# Patient Record
Sex: FEMALE | Race: ASIAN | URBAN - METROPOLITAN AREA
[De-identification: names, ages, dates, MRNs, and addresses within clinical notes are randomized per-mention and may not be internally consistent; named-entity substitution may affect disease eponyms.]

---

## 2017-01-04 ENCOUNTER — TELEPHONE (OUTPATIENT)
Dept: FAMILY MEDICINE | Facility: CLINIC | Age: 42
End: 2017-01-04

## 2017-01-04 NOTE — TELEPHONE ENCOUNTER
Patient notified with information noted below from provider and agrees with plan to follow up with oncology ASAP.  Janene Harrison RN  [employer]riage [employer]

## 2017-01-04 NOTE — TELEPHONE ENCOUNTER
Took call from Dr. Mcadams from [facility] on behalf of Yara Breaux, It appears that patient has a large 7 cm necrotic lymph node in her pelvis and 3 new reactive lymph nodes, appears metastatic. She needs to follow up with her oncologist ASAP.     Triage- please call patient with results and instructions to see her oncologist.

## 2017-01-04 NOTE — TELEPHONE ENCOUNTER
Had test completed at [facility] on December 30 th. Contacted [facility] and records states they have not been read out and will send request to them as STAT and send results to provider for review.  Please watch for fax.  Janene Harrison RN  [employer]

## 2017-01-07 ENCOUNTER — TRANSFERRED RECORDS (OUTPATIENT)
Dept: HEALTH INFORMATION MANAGEMENT | Facility: CLINIC | Age: 42
End: 2017-01-07

## 2017-01-08 ENCOUNTER — TRANSFERRED RECORDS (OUTPATIENT)
Dept: HEALTH INFORMATION MANAGEMENT | Facility: CLINIC | Age: 42
End: 2017-01-08

## 2017-01-09 ENCOUNTER — TRANSFERRED RECORDS (OUTPATIENT)
Dept: HEALTH INFORMATION MANAGEMENT | Facility: CLINIC | Age: 42
End: 2017-01-09

## 2017-01-10 ENCOUNTER — TRANSFERRED RECORDS (OUTPATIENT)
Dept: HEALTH INFORMATION MANAGEMENT | Facility: CLINIC | Age: 42
End: 2017-01-10

## 2017-01-19 ENCOUNTER — TRANSFERRED RECORDS (OUTPATIENT)
Dept: HEALTH INFORMATION MANAGEMENT | Facility: CLINIC | Age: 42
End: 2017-01-19

## 2017-01-26 ENCOUNTER — TRANSFERRED RECORDS (OUTPATIENT)
Dept: HEALTH INFORMATION MANAGEMENT | Facility: CLINIC | Age: 42
End: 2017-01-26

## 2017-03-07 ENCOUNTER — OFFICE VISIT (OUTPATIENT)
Dept: FAMILY MEDICINE | Facility: CLINIC | Age: 42
End: 2017-03-07
Payer: COMMERCIAL

## 2017-03-07 ENCOUNTER — RADIANT APPOINTMENT (OUTPATIENT)
Dept: GENERAL RADIOLOGY | Facility: CLINIC | Age: 42
End: 2017-03-07
Attending: INTERNAL MEDICINE
Payer: COMMERCIAL

## 2017-03-07 VITALS
SYSTOLIC BLOOD PRESSURE: 86 MMHG | HEIGHT: 62 IN | TEMPERATURE: 99.9 F | WEIGHT: 93.4 LBS | DIASTOLIC BLOOD PRESSURE: 58 MMHG | BODY MASS INDEX: 17.19 KG/M2 | HEART RATE: 103 BPM | OXYGEN SATURATION: 98 %

## 2017-03-07 DIAGNOSIS — R50.9 FEVER, UNSPECIFIED: Primary | ICD-10-CM

## 2017-03-07 DIAGNOSIS — C79.9 METASTASIS FROM CERVICAL CANCER (H): ICD-10-CM

## 2017-03-07 DIAGNOSIS — R50.9 FEVER, UNSPECIFIED: ICD-10-CM

## 2017-03-07 DIAGNOSIS — C53.9 METASTASIS FROM CERVICAL CANCER (H): ICD-10-CM

## 2017-03-07 DIAGNOSIS — N30.00 ACUTE CYSTITIS WITHOUT HEMATURIA: ICD-10-CM

## 2017-03-07 DIAGNOSIS — R05.9 COUGH: ICD-10-CM

## 2017-03-07 DIAGNOSIS — R82.90 NONSPECIFIC FINDING ON EXAMINATION OF URINE: ICD-10-CM

## 2017-03-07 LAB
ALBUMIN UR-MCNC: 100 MG/DL
APPEARANCE UR: ABNORMAL
BACTERIA #/AREA URNS HPF: ABNORMAL /HPF
BASOPHILS # BLD AUTO: 0 10E9/L (ref 0–0.2)
BASOPHILS NFR BLD AUTO: 0.2 %
BILIRUB UR QL STRIP: NEGATIVE
COLOR UR AUTO: YELLOW
DIFFERENTIAL METHOD BLD: ABNORMAL
EOSINOPHIL # BLD AUTO: 0 10E9/L (ref 0–0.7)
EOSINOPHIL NFR BLD AUTO: 0.2 %
ERYTHROCYTE [DISTWIDTH] IN BLOOD BY AUTOMATED COUNT: 13.7 % (ref 10–15)
GLUCOSE UR STRIP-MCNC: NEGATIVE MG/DL
HCT VFR BLD AUTO: 30.6 % (ref 35–47)
HGB BLD-MCNC: 9.4 G/DL (ref 11.7–15.7)
HGB UR QL STRIP: NEGATIVE
KETONES UR STRIP-MCNC: NEGATIVE MG/DL
LEUKOCYTE ESTERASE UR QL STRIP: ABNORMAL
LYMPHOCYTES # BLD AUTO: 1 10E9/L (ref 0.8–5.3)
LYMPHOCYTES NFR BLD AUTO: 10.9 %
MCH RBC QN AUTO: 27.2 PG (ref 26.5–33)
MCHC RBC AUTO-ENTMCNC: 30.7 G/DL (ref 31.5–36.5)
MCV RBC AUTO: 88 FL (ref 78–100)
MONOCYTES # BLD AUTO: 0.9 10E9/L (ref 0–1.3)
MONOCYTES NFR BLD AUTO: 9.9 %
MUCOUS THREADS #/AREA URNS LPF: PRESENT /LPF
NEUTROPHILS # BLD AUTO: 7.1 10E9/L (ref 1.6–8.3)
NEUTROPHILS NFR BLD AUTO: 78.8 %
NITRATE UR QL: NEGATIVE
NON-SQ EPI CELLS #/AREA URNS LPF: ABNORMAL /LPF
PH UR STRIP: 5.5 PH (ref 5–7)
PLATELET # BLD AUTO: 455 10E9/L (ref 150–450)
RBC # BLD AUTO: 3.46 10E12/L (ref 3.8–5.2)
RBC #/AREA URNS AUTO: ABNORMAL /HPF (ref 0–2)
SP GR UR STRIP: >1.03 (ref 1–1.03)
URN SPEC COLLECT METH UR: ABNORMAL
UROBILINOGEN UR STRIP-ACNC: 0.2 EU/DL (ref 0.2–1)
WBC # BLD AUTO: 9 10E9/L (ref 4–11)
WBC #/AREA URNS AUTO: ABNORMAL /HPF (ref 0–2)

## 2017-03-07 PROCEDURE — 87086 URINE CULTURE/COLONY COUNT: CPT | Performed by: INTERNAL MEDICINE

## 2017-03-07 PROCEDURE — 99215 OFFICE O/P EST HI 40 MIN: CPT | Performed by: INTERNAL MEDICINE

## 2017-03-07 PROCEDURE — 71020 XR CHEST 2 VW: CPT

## 2017-03-07 PROCEDURE — 85025 COMPLETE CBC W/AUTO DIFF WBC: CPT | Performed by: INTERNAL MEDICINE

## 2017-03-07 PROCEDURE — 80053 COMPREHEN METABOLIC PANEL: CPT | Performed by: INTERNAL MEDICINE

## 2017-03-07 PROCEDURE — 36415 COLL VENOUS BLD VENIPUNCTURE: CPT | Performed by: INTERNAL MEDICINE

## 2017-03-07 PROCEDURE — 81001 URINALYSIS AUTO W/SCOPE: CPT | Performed by: INTERNAL MEDICINE

## 2017-03-07 RX ORDER — CIPROFLOXACIN 500 MG/1
500 TABLET, FILM COATED ORAL 2 TIMES DAILY
Qty: 14 TABLET | Refills: 0 | Status: SHIPPED | OUTPATIENT
Start: 2017-03-07 | End: 2017-04-12

## 2017-03-07 NOTE — MR AVS SNAPSHOT
After Visit Summary   3/7/2017    Corinne Escalona    MRN: [id number]           Patient Information     Date Of Birth          1975        Visit Information        Provider Department      3/7/2017 2:20 PM Andie Benavides MD [facility] [place]        Today's Diagnoses     Fever, unspecified    -  1    Metastasis from cervical cancer (H)        Cough           Follow-ups after your visit        Additional Services     PALLIATIVE CARE REFERRAL       Your provider has referred you to Palliative Care Services.    To schedule an Outpatient Palliative Care Referral appointment, please call: [facility]: [facility] [phone]   [email or web address]    Please be aware that coverage of these services is subject to the terms and limitations of your health insurance plan.  Call member services at your health plan with any benefit or coverage questions.      Please bring the following with you to your appointment:    (1) Any X-Rays, CTs or MRIs which have been performed.  Contact the facility where they were done to arrange for  prior to your scheduled appointment.   (2) If you have recently seen a provider outside of the [facility], please bring your most recent clinic note and/or imaging results  (3) List of current medications - please bring ALL of the medications that you are currently taking (in their original bottles) to your appointment  (4) This referral request  (5) Any documents/labs given to you for this referral    Services Requested: Consult and make recommendations and Evaluate and treat symptoms (including writing prescriptions)    Please complete the following questions:  1. What is patient's life-limiting diagnosis? Metastatic cervical cancer  2. What is the reason for the referral? Chronic LLQ pain  3. What is the patient's prognosis? Currently doing mostly quality of  life based approach. Months-years    Palliative Care Definition:    Palliative Care is specialized medical care for people with serious illness.  This type of care is focused on providing patients with relief from symptoms, pain and stresses of serious illness - whatever the diagnosis may be.  The goal of Palliative Care is to improve quality of life for both the patient and the family.  Palliative Care is provided by a team of doctors, nurses and other specialists who work with the patient s other doctors to provide an extra layer of support.  Palliative Care is appropriate at any age and at any stage in a serious illness, and can be provided together with curative treatment.                  Who to contact     If you have questions or need follow up information about today's clinic visit or your schedule please contact [facility] [place] directly at [phone].  Normal or non-critical lab and imaging results will be communicated to you by Ibottahart, letter or phone within 4 business days after the clinic has received the results. If you do not hear from us within 7 days, please contact the clinic through Ibottahart or phone. If you have a critical or abnormal lab result, we will notify you by phone as soon as possible.  Submit refill requests through Neomatrix or call your pharmacy and they will forward the refill request to us. Please allow 3 business days for your refill to be completed.          Additional Information About Your Visit        Neomatrix Information     Neomatrix gives you secure access to your electronic health record. If you see a primary care provider, you can also send messages to your care team and make appointments. If you have questions, please call your primary care clinic.  If you do not have a primary care provider, please call [phone] and they will assist you.        Care EveryWhere ID     This is your Care EveryWhere ID. This could be used by other organizations to access your  "[place] medical records  [id number]        Your Vitals Were     Pulse Temperature Height Last Period Pulse Oximetry Breastfeeding?    103 99.9  F (37.7  C) (Tympanic) 5' 2\" (1.575 m) 04/17/2015 (Exact Date) 98% No    BMI (Body Mass Index)                   17.08 kg/m2            Blood Pressure from Last 3 Encounters:   03/07/17 (!) 86/58   12/29/16 (!) 88/62   02/23/16 98/59    Weight from Last 3 Encounters:   03/07/17 93 lb 6.4 oz (42.4 kg)   12/29/16 99 lb 6.4 oz (45.1 kg)   02/23/16 111 lb (50.3 kg)              We Performed the Following     CBC with platelets and differential     Comprehensive metabolic panel (BMP + Alb, Alk Phos, ALT, AST, Total. Bili, TP)     PALLIATIVE CARE REFERRAL     UA with Microscopic        Primary Care Provider Office Phone # Fax #    Sarah Galloway [phone]       [facility] [address] [place] [address] [address]  [place] [address]        Thank you!     Thank you for choosing [facility]  for your care. Our goal is always to provide you with excellent care. Hearing back from our patients is one way we can continue to improve our services. Please take a few minutes to complete the written survey that you may receive in the mail after your visit with us. Thank you!             Your Updated Medication List - Protect others around you: Learn how to safely use, store and throw away your medicines at www.disposemymeds.org.          This list is accurate as of: 3/7/17  3:25 PM.  Always use your most recent med list.                   Brand Name Dispense Instructions for use    ibuprofen 600 MG tablet    ADVIL/MOTRIN    30 tablet    Take 1 tablet (600 mg) by mouth every 6 hours as needed for moderate pain       NAPROXEN PO      Take 220 mg by mouth as needed for moderate pain       OMEGA-3 FISH OIL PO      Take 1 g by mouth daily       PROBIOTIC DAILY PO      Take 1 capsule by mouth daily       TURMERIC PO      Take by mouth daily       TYLENOL PO "      Take 1,000 mg by mouth every 6 hours as needed for mild pain or fever       VITAMIN E COMPLEX PO

## 2017-03-07 NOTE — NURSING NOTE
"Chief Complaint   Patient presents with     Fatigue       Initial BP (!) 86/58 (BP Location: Left arm, Patient Position: Chair, Cuff Size: Adult Regular)  Pulse 103  Temp 99.9  F (37.7  C) (Tympanic)  Ht 5' 2\" (1.575 m)  Wt 93 lb 6.4 oz (42.4 kg)  LMP 04/17/2015 (Exact Date)  SpO2 98%  Breastfeeding? No  BMI 17.08 kg/m2 Estimated body mass index is 17.08 kg/(m^2) as calculated from the following:    Height as of this encounter: 5' 2\" (1.575 m).    Weight as of this encounter: 93 lb 6.4 oz (42.4 kg).  Medication Reconciliation: complete Vanna Livingston MA      "

## 2017-03-07 NOTE — PROGRESS NOTES
"  SUBJECTIVE:                                                    Corinne Escalona is a 41 year old female who presents to clinic today for the following health issues:    Corinne is here with low grade fevers and fatigue for about 2 weeks.  She has metastatic cervical cancer. Her last visit with [facility] was at the end of January; she has decided to forgo palliative chemotherapy and focus on quality of life.  She would also like a palliative care referral today.     Fatigue, generalized weakness, and fevers - up to 100.4 or so at home.  What prompted her to be evaluated today was she got a bad headache during her vitamin C infusion.  She has no neck stiffness or confusion or focal weakness.  Also is starting to feel dehydrated - her mouth and throat feel very dry. She does have a dry cough and some SOB. Trying to drink a lot but urine is still dark yellow.  She has gotten dehydrated and been anemic in the past so would like to get labs checked.  She has been taking tylenol and ibuprofen at home for pain and fevers.   She has chronic LLQ and groin pain from metastasis and scarring from prior colonic abscess and perforation; that is unchanged.  She doesn't have dysuria, but her LLQ hurt more than usual when she urinates.  No nausea or vomiting, no change in her osteomy output.           Reviewed and updated as needed this visit by clinical staff  Tobacco  Allergies  Meds  Problems  Med Hx  Surg Hx  Fam Hx  Soc Hx        Reviewed and updated as needed this visit by Provider  Problems         ROS:  Constitutional, HEENT, cardiovascular, pulmonary, GI, , musculoskeletal, neuro, skin, and psych systems are negative, except as otherwise noted.    OBJECTIVE:                                                    BP (!) 86/58 (BP Location: Left arm, Patient Position: Chair, Cuff Size: Adult Regular)  Pulse 103  Temp 99.9  F (37.7  C) (Tympanic)  Ht 5' 2\" (1.575 m)  Wt 93 lb 6.4 oz (42.4 kg)  LMP 04/17/2015 " (Exact Date)  SpO2 98%  Breastfeeding? No  BMI 17.08 kg/m2  Body mass index is 17.08 kg/(m^2).    Gen: thin, tired appearing, pleasant woman, no distress  HEENT: PERRL, sclera nonicteric, slightly dry MM  Pulm: breathing comfortably, CTAB, no wheezes or rales  CV: mild tachycardia, normal S1 and S2, no murmurs  Abd: BS present, soft, firm mass in LLQ. osteomy present, liquid drainage, red appearing (states she had cranberries on toast this morning)   Ext: 2+ distal pulses, no LE edema       Diagnostic Test Results:  CBC - hct 30.6, stable. WBC 9, plts 455  Urinalysis - sg > 1.030, small LE, 25-50 WBCs, many bacteria. Mod squams and mucous present.   CXR - no infiltrates noted      ASSESSMENT/PLAN:                                                      Fever and fatigue in 41 year old woman with metastatic colon cancer and s/p ileostomy, not on any chemotherapy.  Her CBC is stable, CXR clear. Urine may be consistent with UTI, will start treatment with cipro.  CMP pending.  If symptoms not improving with cipro, then will likely need to image her abdomen given her mets, multiple surgeries, and history of abdominal infection.  If she starts to feel lightheaded or is unable to increase her fluid intake, she was advised to go to the ED for IV fluids.     Referral was made to palliative care.     F/U as needed for persistent or worsening symptoms.       Andie Benavides MD  [facility]

## 2017-03-08 PROBLEM — Z93.2 S/P ILEOSTOMY (H): Status: ACTIVE | Noted: 2017-03-08

## 2017-03-08 LAB
ALBUMIN SERPL-MCNC: 2.9 G/DL (ref 3.4–5)
ALP SERPL-CCNC: 128 U/L (ref 40–150)
ALT SERPL W P-5'-P-CCNC: 37 U/L (ref 0–50)
ANION GAP SERPL CALCULATED.3IONS-SCNC: 8 MMOL/L (ref 3–14)
AST SERPL W P-5'-P-CCNC: 23 U/L (ref 0–45)
BACTERIA SPEC CULT: NO GROWTH
BILIRUB SERPL-MCNC: 0.2 MG/DL (ref 0.2–1.3)
BUN SERPL-MCNC: 12 MG/DL (ref 7–30)
CALCIUM SERPL-MCNC: 8.5 MG/DL (ref 8.5–10.1)
CHLORIDE SERPL-SCNC: 98 MMOL/L (ref 94–109)
CO2 SERPL-SCNC: 30 MMOL/L (ref 20–32)
CREAT SERPL-MCNC: 0.82 MG/DL (ref 0.52–1.04)
GFR SERPL CREATININE-BSD FRML MDRD: 76 ML/MIN/1.7M2
GLUCOSE SERPL-MCNC: 93 MG/DL (ref 70–99)
MICRO REPORT STATUS: NORMAL
POTASSIUM SERPL-SCNC: 3.9 MMOL/L (ref 3.4–5.3)
PROT SERPL-MCNC: 7.7 G/DL (ref 6.8–8.8)
SODIUM SERPL-SCNC: 136 MMOL/L (ref 133–144)
SPECIMEN SOURCE: NORMAL

## 2017-03-08 RX ORDER — TRAMADOL HYDROCHLORIDE 50 MG/1
50-100 TABLET ORAL EVERY 6 HOURS PRN
Qty: 60 TABLET | Refills: 0 | Status: SHIPPED | OUTPATIENT
Start: 2017-03-08 | End: 2017-04-12

## 2017-03-08 NOTE — PROGRESS NOTES
Urine culture returned as no growth.  Called Corinne and recommended stop ciprofloxacin.   With normal WBC and CMP and no increased abdominal tenderness, low grade fevers may be due to her cancer.  I recommended we keep an eye on her symptoms - if gets higher fever or new abdominal symptoms will do CT scan. She is okay with that plan.   Will send tramadol rx to [facility] for pain relief while awaiting palliative care appt.     Andie Benavides MD

## 2017-03-31 ENCOUNTER — TELEPHONE (OUTPATIENT)
Dept: FAMILY MEDICINE | Facility: CLINIC | Age: 42
End: 2017-03-31

## 2017-04-03 NOTE — TELEPHONE ENCOUNTER
Patient needs an appointment in order for form to be completed   left voicemail message for patient to contact Main Clinic Number to schedule.  NTBS: Disability form completion    Yoana SAEZ

## 2017-04-12 ENCOUNTER — TELEPHONE (OUTPATIENT)
Dept: FAMILY MEDICINE | Facility: CLINIC | Age: 42
End: 2017-04-12

## 2017-04-12 ENCOUNTER — OFFICE VISIT (OUTPATIENT)
Dept: FAMILY MEDICINE | Facility: CLINIC | Age: 42
End: 2017-04-12
Payer: COMMERCIAL

## 2017-04-12 VITALS
DIASTOLIC BLOOD PRESSURE: 50 MMHG | HEIGHT: 62 IN | BODY MASS INDEX: 16.56 KG/M2 | HEART RATE: 121 BPM | TEMPERATURE: 98.9 F | SYSTOLIC BLOOD PRESSURE: 86 MMHG | OXYGEN SATURATION: 99 % | WEIGHT: 90 LBS

## 2017-04-12 DIAGNOSIS — Z02.9 ADMINISTRATIVE ENCOUNTER: Primary | ICD-10-CM

## 2017-04-12 DIAGNOSIS — C79.9 METASTASIS FROM CERVICAL CANCER (H): ICD-10-CM

## 2017-04-12 DIAGNOSIS — R11.2 NAUSEA AND VOMITING, INTRACTABILITY OF VOMITING NOT SPECIFIED, UNSPECIFIED VOMITING TYPE: ICD-10-CM

## 2017-04-12 DIAGNOSIS — C53.9 METASTASIS FROM CERVICAL CANCER (H): ICD-10-CM

## 2017-04-12 DIAGNOSIS — Z78.9 POOR TOLERANCE FOR AMBULATION: ICD-10-CM

## 2017-04-12 PROCEDURE — 99214 OFFICE O/P EST MOD 30 MIN: CPT | Performed by: FAMILY MEDICINE

## 2017-04-12 RX ORDER — ONDANSETRON 4 MG/1
4 TABLET, FILM COATED ORAL EVERY 8 HOURS PRN
Qty: 60 TABLET | Refills: 1 | Status: SHIPPED | OUTPATIENT
Start: 2017-04-12

## 2017-04-12 RX ORDER — TRAMADOL HYDROCHLORIDE 50 MG/1
100 TABLET ORAL EVERY 8 HOURS PRN
Qty: 120 TABLET | Refills: 1 | Status: SHIPPED | OUTPATIENT
Start: 2017-04-12

## 2017-04-12 NOTE — TELEPHONE ENCOUNTER
Forms faxed to [facility] [phone]  Also sent to stat abstracting  LVM for patient to contact clinic to let us know if she would like the originals mailed or if she would like to pick them up  Forms in TC Top tray that says SE INBOX  Yoana SAEZ

## 2017-04-12 NOTE — NURSING NOTE
"Chief Complaint   Patient presents with     Forms     disability forms       Initial BP (!) 86/50 (BP Location: Right arm, Cuff Size: Adult Regular)  Pulse 121  Temp 98.9  F (37.2  C) (Tympanic)  Ht 5' 2\" (1.575 m)  Wt 90 lb (40.8 kg)  LMP 04/17/2015 (Exact Date)  SpO2 99%  BMI 16.46 kg/m2 Estimated body mass index is 16.46 kg/(m^2) as calculated from the following:    Height as of this encounter: 5' 2\" (1.575 m).    Weight as of this encounter: 90 lb (40.8 kg).  Medication Reconciliation: complete  "

## 2017-04-12 NOTE — PROGRESS NOTES
SUBJECTIVE:                                                    Corinne Escalona is a 41 year old female who presents to clinic today for the following health issues:      Concern - disability forms       Patient comes in today with 3 forms to be filled out. Patient has metastatic cervical cancer. Palliative care has been recommended. She would like to explore more treatment options which are nontraditional. Planning to go to [facility] in the next 2 weeks with her children to explore other options for management. She thinks she'll be happier there with her siblings and parents living with her and supporting her and comforting her through this process. Her  will continue to stay here and work. She plans that if everything goes well she will would be back in August 2017.    Her oncologist has commented on a very limited life expectancy, perhaps less than 6 months for her    Patient has 2 life insurance plans that she would like to apply for. And disability forms. Also request for a transfer chair with wheels.        Problem list and histories reviewed & adjusted, as indicated.  Additional history: as documented    Patient Active Problem List   Diagnosis     Adenocarcinoma in situ (AIS) of uterine cervix     Cervical cancer (H)     S/P ileostomy (H)     Past Surgical History:   Procedure Laterality Date     CYSTOSCOPY N/A 5/29/2015    Procedure: CYSTOSCOPY;  Surgeon: Kumar Rodriguez MD;  Location:  OR     DAVINCI HYSTERECTOMY TOTAL, BILATERAL SALPINGO-OOPHORECTOMY, NODE DISSECTION, COMBINED Bilateral 5/29/2015    Procedure: COMBINED DAVINCI HYSTERECTOMY TOTAL, SALPINGO-OOPHORECTOMY, NODE DISSECTION;  Surgeon: Kumar Rodriguez MD;  Location:  OR     NO HISTORY OF SURGERY         Social History   Substance Use Topics     Smoking status: Never Smoker     Smokeless tobacco: Never Used     Alcohol use 0.0 oz/week     0 Standard drinks or equivalent per week      Comment: SOCIAL     Family History  "  Problem Relation Age of Onset     Hypertension Father      DIABETES Mother      Thyroid Disease Mother      CANCER Maternal Grandfather      pancreatic CA     Mental Illness Brother          Current Outpatient Prescriptions   Medication Sig Dispense Refill     traMADol (ULTRAM) 50 MG tablet Take 2 tablets (100 mg) by mouth every 8 hours as needed for pain maximum 8 tablet(s) per day 120 tablet 1     ondansetron (ZOFRAN) 4 MG tablet Take 1 tablet (4 mg) by mouth every 8 hours as needed for nausea or vomiting 60 tablet 1     order for DME Equipment being ordered: Transport wheelchair with foot rest. 1 each 0     NAPROXEN PO Take 220 mg by mouth as needed for moderate pain       Allergies   Allergen Reactions     Latex Rash       Reviewed and updated as needed this visit by clinical staff       Reviewed and updated as needed this visit by Provider         ROS:  C: NEGATIVE for fever, chills, change in weight  E/M: NEGATIVE for ear, mouth and throat problems  R: NEGATIVE for significant cough or SOB  CV: NEGATIVE for chest pain, palpitations or peripheral edema    OBJECTIVE:                                                    BP (!) 86/50 (BP Location: Right arm, Cuff Size: Adult Regular)  Pulse 121  Temp 98.9  F (37.2  C) (Tympanic)  Ht 5' 2\" (1.575 m)  Wt 90 lb (40.8 kg)  LMP 04/17/2015 (Exact Date)  SpO2 99%  BMI 16.46 kg/m2  Body mass index is 16.46 kg/(m^2).   GENERAL: Appears lethargic. Cannot maintain a straight posture.   HENT: ear canals- normal; TMs- normal; Nose- normal; Mouth- no ulcers, no lesions  NECK: no tenderness, no adenopathy, no asymmetry, no masses, no stiffness; thyroid- normal to palpation  RESP: lungs clear to auscultation - no rales, no rhonchi, no wheezes  CV: regular rates and rhythm, normal S1 S2, no S3 or S4 and no murmur, no click or rub -  ABDOMEN: soft, no tenderness, no  hepatosplenomegaly, no masses, normal bowel sounds         ASSESSMENT/PLAN:                                   "                      ICD-10-CM    1. Administrative encounter Z02.9    2. Metastasis from cervical cancer (H) C79.9 traMADol (ULTRAM) 50 MG tablet    C53.9 order for DME   3. Nausea and vomiting, intractability of vomiting not specified, unspecified vomiting type R11.2 ondansetron (ZOFRAN) 4 MG tablet   4. Poor tolerance for ambulation Z78.9 order for DME       All the forms filled out for her. Refill on pain medication and on nausea medication ordered.  DME ordered.   Follow up with Provider - as needed     Sarah Galloway MD  [facility]

## 2017-04-12 NOTE — MR AVS SNAPSHOT
After Visit Summary   4/12/2017    Corinne Escalona    MRN: [id number]           Patient Information     Date Of Birth          1975        Visit Information        Provider Department      4/12/2017 10:40 AM [facility] MD [facility] [place]        Today's Diagnoses     Nausea and vomiting, intractability of vomiting not specified, unspecified vomiting type    -  1    Metastasis from cervical cancer (H)        Poor tolerance for ambulation           Follow-ups after your visit        Who to contact     If you have questions or need follow up information about today's clinic visit or your schedule please contact [facility] [place] directly at [phone].  Normal or non-critical lab and imaging results will be communicated to you by MyChart, letter or phone within 4 business days after the clinic has received the results. If you do not hear from us within 7 days, please contact the clinic through BOLETUS NETWORKhart or phone. If you have a critical or abnormal lab result, we will notify you by phone as soon as possible.  Submit refill requests through Uberseq or call your pharmacy and they will forward the refill request to us. Please allow 3 business days for your refill to be completed.          Additional Information About Your Visit        MyChart Information     Uberseq gives you secure access to your electronic health record. If you see a primary care provider, you can also send messages to your care team and make appointments. If you have questions, please call your primary care clinic.  If you do not have a primary care provider, please call [phone] and they will assist you.        Care EveryWhere ID     This is your Care EveryWhere ID. This could be used by other organizations to access your [place] medical records  [id number]        Your Vitals Were     Pulse Temperature Height Last Period Pulse Oximetry BMI (Body Mass Index)    121 98.9  F (37.2  C) (Tympanic)  "5' 2\" (1.575 m) 04/17/2015 (Exact Date) 99% 16.46 kg/m2       Blood Pressure from Last 3 Encounters:   04/12/17 (!) 86/50   03/07/17 (!) 86/58   12/29/16 (!) 88/62    Weight from Last 3 Encounters:   04/12/17 90 lb (40.8 kg)   03/07/17 93 lb 6.4 oz (42.4 kg)   12/29/16 99 lb 6.4 oz (45.1 kg)              Today, you had the following     No orders found for display         Today's Medication Changes          These changes are accurate as of: 4/12/17 11:28 AM.  If you have any questions, ask your nurse or doctor.               Start taking these medicines.        Dose/Directions    ondansetron 4 MG tablet   Commonly known as:  ZOFRAN   Used for:  Nausea and vomiting, intractability of vomiting not specified, unspecified vomiting type   Started by:  Sarah Galloway MD        Dose:  4 mg   Take 1 tablet (4 mg) by mouth every 8 hours as needed for nausea or vomiting   Quantity:  60 tablet   Refills:  1       order for DME   Used for:  Metastasis from cervical cancer (H), Poor tolerance for ambulation   Started by:  Sarah Galloway MD        Equipment being ordered: Transport wheelchair with foot rest.   Quantity:  1 each   Refills:  0         These medicines have changed or have updated prescriptions.        Dose/Directions    traMADol 50 MG tablet   Commonly known as:  ULTRAM   This may have changed:    - how much to take  - when to take this   Used for:  Metastasis from cervical cancer (H)   Changed by:  Sarah Galloway MD        Dose:  100 mg   Take 2 tablets (100 mg) by mouth every 8 hours as needed for pain maximum 8 tablet(s) per day   Quantity:  120 tablet   Refills:  1         Stop taking these medicines if you haven't already. Please contact your care team if you have questions.     ibuprofen 600 MG tablet   Commonly known as:  ADVIL/MOTRIN   Stopped by:  Sarah Galloway MD           OMEGA-3 FISH OIL PO   Stopped by:  Sarah Galloway MD           PROBIOTIC DAILY PO   Stopped by:  Sarah Galloway MD           TURMERIC PO "   Stopped by:  Sarah Galloway MD           TYLENOL PO   Stopped by:  Sarah Galloway MD           VITAMIN E COMPLEX PO   Stopped by:  Sarah Galloway MD                Where to get your medicines      These medications were sent to [id number] - [place] - [address]  [address], [place] [address]     Phone:  [phone]     ondansetron 4 MG tablet         Some of these will need a paper prescription and others can be bought over the counter.  Ask your nurse if you have questions.     Bring a paper prescription for each of these medications     order for DME    traMADol 50 MG tablet                Primary Care Provider Office Phone # Fax #    Sarah Galloway [phone]       [address]  [place] [address]        Thank you!     Thank you for choosing [facility]  for your care. Our goal is always to provide you with excellent care. Hearing back from our patients is one way we can continue to improve our services. Please take a few minutes to complete the written survey that you may receive in the mail after your visit with us. Thank you!             Your Updated Medication List - Protect others around you: Learn how to safely use, store and throw away your medicines at www.disposemymeds.org.          This list is accurate as of: 4/12/17 11:28 AM.  Always use your most recent med list.                   Brand Name Dispense Instructions for use    NAPROXEN PO      Take 220 mg by mouth as needed for moderate pain       ondansetron 4 MG tablet    ZOFRAN    60 tablet    Take 1 tablet (4 mg) by mouth every 8 hours as needed for nausea or vomiting       order for DME     1 each    Equipment being ordered: Transport wheelchair with foot rest.       traMADol 50 MG tablet    ULTRAM    120 tablet    Take 2 tablets (100 mg) by mouth every 8 hours as needed for pain maximum 8 tablet(s) per day

## 2017-04-13 ENCOUNTER — TRANSFERRED RECORDS (OUTPATIENT)
Dept: HEALTH INFORMATION MANAGEMENT | Facility: CLINIC | Age: 42
End: 2017-04-13

## 2017-04-17 ENCOUNTER — TELEPHONE (OUTPATIENT)
Dept: FAMILY MEDICINE | Facility: CLINIC | Age: 42
End: 2017-04-17

## 2017-04-17 NOTE — TELEPHONE ENCOUNTER
Reason for Call:  Other LETTER FOR [employer]    Detailed comments: Patient is calling in because [place] of [place]. Patient is needing a letter from Dr. Galloway stating she is unable to workout from 01/01/2017 to 11/30/2017 (January to Novemeber of 2017). You can fax letter to [employer] at [phone], ATTN: Joy.    Phone Number Patient can be reached at: Home number on file [phone] (home)    Best Time: any    Can we leave a detailed message on this number? YES    Call taken on 4/17/2017 at 4:53 PM by Ana María Gifford

## 2017-04-18 ENCOUNTER — TELEPHONE (OUTPATIENT)
Dept: FAMILY MEDICINE | Facility: CLINIC | Age: 42
End: 2017-04-18

## 2017-04-18 NOTE — TELEPHONE ENCOUNTER
Spoke with patient and informed of below. She states that she will look with her papers.   Aria Sherwood RN   [facility] - Triage

## 2017-04-18 NOTE — TELEPHONE ENCOUNTER
Letter done. Please fax it. And notify patient.     Sarah Galloway MD  [facility] [place]

## 2017-04-18 NOTE — TELEPHONE ENCOUNTER
The patient was given the prescription in hand. Have her look through her papers to see if she can find it. It's a controlled substance, therefore she would need hard copy.    Sarah Galloway MD  [facility] [place]

## 2017-04-18 NOTE — TELEPHONE ENCOUNTER
Patient calling because when she went to [facility] yesterday her Tramadol wasn't there  Was this given to the patient in clinic at her appointment on 4/12?    TC doesn't see that this was documented that it was faxed   Patient is now out of town and wants it to go to a [facility] there (TC pended pharmacy)  Patient would like a call when it's been faxed [phone]    Yoana SAEZ

## 2017-06-05 ENCOUNTER — TELEPHONE (OUTPATIENT)
Dept: FAMILY MEDICINE | Facility: CLINIC | Age: 42
End: 2017-06-05

## 2017-06-05 NOTE — TELEPHONE ENCOUNTER
Patient is already in [facility], he is back in [facility] now.     She has Oxycodone from hospitalization time and has been taking it there also    He is not sure if she received Tramadol rx or not.     Reports that she has private nurses and doctors monitoring her condition, not sure how much longer she has to live, is in pain.    He is unsure if [place] will return to [place].    Explained situation to  that prescription from clinic would not be able to be sent to patient in [facility] due to being a controlled substance.     Advised  to contact his insurance to see if they will cover medication if it was filled at [facility] pharmacy by provider in [facility].     JOHN Reynaga

## 2017-06-05 NOTE — TELEPHONE ENCOUNTER
Patient's spouse Joel walked into the clinic  Patient has cervical cancer and he is requesting Fentanyl to send back to patient in the [facility]  Any questions, Joel can be reached at [phone]  Yoana SAEZ

## 2017-06-05 NOTE — TELEPHONE ENCOUNTER
How is he planning to send it to her ? Is he travelling to go there ?  I have never ordered Fentanyl for her.   She was given Tramadol on her last visit here with 1 RF.  She was told to get more pain medicine in [facility] if needed.    Sarah Galloway MD  [facility], [place]

## 2018-06-12 ENCOUNTER — HEALTH MAINTENANCE LETTER (OUTPATIENT)
Age: 43
End: 2018-06-12

## 2019-09-10 NOTE — TELEPHONE ENCOUNTER
PT CALLED WOULD LIKE A NURSE TO CALL HER BACK WANTS THE RESULTS OF THE CDI TEST SHE HAD ON 12/30  PT# [phone]  CELL, HAS ON ALL THE TIME, OK TO LEAVE MESSAGE.   yes